# Patient Record
Sex: FEMALE | NOT HISPANIC OR LATINO | URBAN - METROPOLITAN AREA
[De-identification: names, ages, dates, MRNs, and addresses within clinical notes are randomized per-mention and may not be internally consistent; named-entity substitution may affect disease eponyms.]

---

## 2018-02-19 ENCOUNTER — IMPORTED ENCOUNTER (OUTPATIENT)
Dept: URBAN - METROPOLITAN AREA CLINIC 1 | Facility: CLINIC | Age: 53
End: 2018-02-19

## 2018-02-19 PROBLEM — H52.4: Noted: 2018-02-19

## 2018-02-19 PROCEDURE — S0621 ROUTINE OPHTHALMOLOGICAL EXA: HCPCS

## 2018-02-19 NOTE — PATIENT DISCUSSION
1.  Presbyopia: Rx was given for corrective spectacles if indicated. 2.  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 3.  JORDYN OU-REC patient to cont using AT BID up to QID OU 4. [id number]. Return for an appointment in 1 year for 40. with Dr. Merle Ribeiro.

## 2020-05-15 ENCOUNTER — IMPORTED ENCOUNTER (OUTPATIENT)
Dept: URBAN - METROPOLITAN AREA CLINIC 1 | Facility: CLINIC | Age: 55
End: 2020-05-15

## 2020-05-15 PROBLEM — H52.4: Noted: 2020-05-15

## 2020-05-15 PROBLEM — H52.13: Noted: 2020-05-15

## 2020-05-15 PROCEDURE — S0621 ROUTINE OPHTHALMOLOGICAL EXA: HCPCS

## 2020-05-15 NOTE — PATIENT DISCUSSION
1. Myopia w/ Presbyopia -- Rx was given for corrective spectacles if indicated. CL trials dispensed. 2.  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 3.  JRODYN OU -- Cont using ATs BID-QID OU. 4. Cataract OU -- Observe. Return for an appointment in 1 week cc with Dr. Vero Boogie

## 2022-04-02 ASSESSMENT — VISUAL ACUITY
OD_SC: 20/20
OD_SC: J1
OD_SC: 20/20
OS_SC: 20/20
OS_SC: 20/20
OS_SC: J1

## 2022-04-02 ASSESSMENT — TONOMETRY
OD_IOP_MMHG: 20
OD_IOP_MMHG: 12
OS_IOP_MMHG: 13
OS_IOP_MMHG: 19

## 2022-04-02 ASSESSMENT — KERATOMETRY
OS_K2POWER_DIOPTERS: 43.25
OD_AXISANGLE2_DEGREES: 065
OS_AXISANGLE_DEGREES: 027
OD_K1POWER_DIOPTERS: 43.75
OS_AXISANGLE2_DEGREES: 117
OD_AXISANGLE_DEGREES: 155
OS_K1POWER_DIOPTERS: 44.00
OD_K2POWER_DIOPTERS: 43.50